# Patient Record
Sex: MALE | Race: BLACK OR AFRICAN AMERICAN
[De-identification: names, ages, dates, MRNs, and addresses within clinical notes are randomized per-mention and may not be internally consistent; named-entity substitution may affect disease eponyms.]

---

## 2017-12-27 ENCOUNTER — HOSPITAL ENCOUNTER (OUTPATIENT)
Dept: HOSPITAL 24 - ER | Age: 37
Setting detail: OBSERVATION
LOS: 3 days | Discharge: HOME | End: 2017-12-30
Attending: INTERNAL MEDICINE | Admitting: INTERNAL MEDICINE
Payer: COMMERCIAL

## 2017-12-27 VITALS — BODY MASS INDEX: 34.2 KG/M2

## 2017-12-27 DIAGNOSIS — M62.82: ICD-10-CM

## 2017-12-27 DIAGNOSIS — R94.4: ICD-10-CM

## 2017-12-27 DIAGNOSIS — D86.0: ICD-10-CM

## 2017-12-27 DIAGNOSIS — R06.02: ICD-10-CM

## 2017-12-27 DIAGNOSIS — R94.31: ICD-10-CM

## 2017-12-27 DIAGNOSIS — R07.89: Primary | ICD-10-CM

## 2017-12-27 LAB
ALBUMIN SERPL BCP-MCNC: 3.7 G/DL (ref 3.4–5)
ALP SERPL-CCNC: 116 UNITS/L (ref 46–116)
ALT SERPL W P-5'-P-CCNC: 54 UNITS/L (ref 12–78)
AST SERPL W P-5'-P-CCNC: 61 UNITS/L (ref 15–37)
BACTERIA URNS QL MICRO: (no result) /HPF
BASOPHILS # BLD AUTO: 0.1 X10^3/UL (ref 0–0.1)
BASOPHILS NFR BLD AUTO: 1.5 % (ref 0.2–1)
BILIRUB UR QL STRIP.AUTO: NEGATIVE
BUN SERPL-MCNC: 17 MG/DL (ref 7–18)
CALCIUM ALBUM COR SERPL-SCNC: (no result) MG/DL (ref 8.5–10.1)
CALCIUM ALBUM COR SERPL-SCNC: 140 MMOL/L (ref 136–145)
CALCIUM SERPL-MCNC: 9.5 MG/DL (ref 8.5–10.1)
CHLORIDE SERPL-SCNC: 105 MMOL/L (ref 98–107)
CK MB SERPL-MCNC: 16 NG/ML (ref 0–4)
CK SERPL-CCNC: 1577 UNITS/L (ref 39–308)
CKMB %: 1 % (ref ?–4)
CLARITY UR: CLEAR
CO2 SERPL-SCNC: 27.4 MMOL/L (ref 21–32)
COLOR UR AUTO: (no result)
CREAT SERPL-MCNC: 1.59 MG/DL (ref 0.7–1.3)
EGFR  BLACK RACES: > 60 (ref 60–?)
EOSINOPHIL # BLD AUTO: 0.2 X10^3/UL (ref 0–0.2)
EOSINOPHIL NFR BLD AUTO: 3.7 % (ref 0.9–2.9)
ERYTHROCYTE [DISTWIDTH] IN BLOOD BY AUTOMATED COUNT: 13.8 % (ref 11.6–16.5)
GLUCOSE UR QL STRIP.AUTO: NEGATIVE
HCT VFR BLD AUTO: 42.8 % (ref 42–54)
HGB BLD-MCNC: 14.7 G/DL (ref 13.5–18)
KETONES UR QL STRIP.AUTO: NEGATIVE
LEUKOCYTE ESTERASE UR QL STRIP.AUTO: (no result)
LYMPHOCYTES # BLD AUTO: 2.3 X10^3/UL (ref 1.3–2.9)
LYMPHOCYTES NFR BLD AUTO: 41.1 % (ref 21–51)
MAGNESIUM SERPL-MCNC: 1.7 MG/DL (ref 1.7–2.9)
MCH RBC QN AUTO: 30.6 PG (ref 27–34)
MCHC RBC AUTO-ENTMCNC: 34.4 G/DL (ref 33–35)
MCV RBC AUTO: 89.1 FL (ref 80–100)
MONOCYTES # BLD AUTO: 0.8 X10^3/UL (ref 0.3–0.8)
MONOCYTES NFR BLD AUTO: 14.1 % (ref 0–13)
NEUTROPHILS # BLD AUTO: 2.3 X10^3/UL (ref 2.2–4.8)
NEUTROPHILS NFR BLD AUTO: 39.6 % (ref 42–75)
NITRITE UR QL STRIP.AUTO: NEGATIVE
PH UR STRIP.AUTO: 7 [PH] (ref 5–8)
PLATELET # BLD: 258 X10^3/UL (ref 150–450)
PMV BLD AUTO: 6.4 FL (ref 7.4–11)
PROT SERPL-MCNC: 7.6 G/DL (ref 6.4–8.2)
PROT UR QL STRIP.AUTO: NEGATIVE
RBC # BLD AUTO: 4.81 X10^6/UL (ref 4.7–6)
RBC # UR STRIP.AUTO: NEGATIVE /UL
RBC #/AREA URNS HPF: (no result) /HPF
SODIUM SERPL-SCNC: 140 MMOL/L (ref 136–145)
SP GR UR STRIP.AUTO: 1.01 (ref 1–1.03)
SQUAMOUS URNS QL MICRO: (no result) /HPF
TROPONIN I SERPL-MCNC: < 0.02 NG/ML (ref 0–1.5)
UROBILINOGEN UR QL STRIP.AUTO: (no result)
WBC NRBC COR # BLD AUTO: 5.7 X10^3/UL (ref 3.6–10)

## 2017-12-27 PROCEDURE — G0378 HOSPITAL OBSERVATION PER HR: HCPCS

## 2017-12-27 PROCEDURE — 71010: CPT

## 2017-12-27 PROCEDURE — 94760 N-INVAS EAR/PLS OXIMETRY 1: CPT

## 2017-12-27 PROCEDURE — 99217: CPT

## 2017-12-27 PROCEDURE — 82553 CREATINE MB FRACTION: CPT

## 2017-12-27 PROCEDURE — 81001 URINALYSIS AUTO W/SCOPE: CPT

## 2017-12-27 PROCEDURE — 80061 LIPID PANEL: CPT

## 2017-12-27 PROCEDURE — 85610 PROTHROMBIN TIME: CPT

## 2017-12-27 PROCEDURE — 93005 ELECTROCARDIOGRAM TRACING: CPT

## 2017-12-27 PROCEDURE — 85730 THROMBOPLASTIN TIME PARTIAL: CPT

## 2017-12-27 PROCEDURE — 84484 ASSAY OF TROPONIN QUANT: CPT

## 2017-12-27 PROCEDURE — 71020: CPT

## 2017-12-27 PROCEDURE — G0434 DRUG SCREEN MULTI DRUG CLASS: HCPCS

## 2017-12-27 PROCEDURE — 82550 ASSAY OF CK (CPK): CPT

## 2017-12-27 PROCEDURE — 83735 ASSAY OF MAGNESIUM: CPT

## 2017-12-27 PROCEDURE — 80053 COMPREHEN METABOLIC PANEL: CPT

## 2017-12-27 PROCEDURE — 80307 DRUG TEST PRSMV CHEM ANLYZR: CPT

## 2017-12-27 PROCEDURE — 99284 EMERGENCY DEPT VISIT MOD MDM: CPT

## 2017-12-27 PROCEDURE — 93010 ELECTROCARDIOGRAM REPORT: CPT

## 2017-12-27 PROCEDURE — 96365 THER/PROPH/DIAG IV INF INIT: CPT

## 2017-12-27 PROCEDURE — 85025 COMPLETE CBC W/AUTO DIFF WBC: CPT

## 2017-12-27 PROCEDURE — 94640 AIRWAY INHALATION TREATMENT: CPT

## 2017-12-27 PROCEDURE — 36415 COLL VENOUS BLD VENIPUNCTURE: CPT

## 2017-12-27 NOTE — RAD
Chest, one view



Indication: Sharp chest pain



Comparison: 08/01/2016



Findings: The heart size is normal. There are stable chronic interstitial opacities within the mid ramy
ngs bilaterally. No focal consolidation, significant effusion or pneumothorax is identified. No acute
 osseous abnormality is seen.



Impression:  

No acute cardiopulmonary abnormality or significant change since prior.



















Reported By:Electronically Signed by JOANN BRIONES MD at 12/27/2017 9:30:01 PM

## 2017-12-27 NOTE — DR.GENAD
HPI





- PCP


Primary Care Physician: bib





- Complaint/Symptoms


Chief Complaint Doctors Comments: Patient presents to the ED with complaint of 

sharp left/right chest pain for three days.  He denies a history of 

cardiopulmonary disease.  He admits to a pack per week cigarettes smoking.  He 

is diagnosed with sarcoidosis. He is on prednisone daily and has been seen by a 

pulmonologist times one since diagnosis.


Chief Complaint:: pt states" I'm having sharp chest pain on both sides of my 

chest started 3 days ago"





- Source


History Provided: Patient





- Mode of Arrival


Mode of Arrival: Ambulatory





- Timing


Onset of Chief Complaint: 12/25/17





PMH





- PMH


Past Medical History: No


Past Surgical History: No


Surgical History: No History





- Family History


History of Family Medical Conditions: No





- Social History


Does patient currently use any type of tobacco product: Yes


Have you used tobacco products in the last 12 months: No


Type of Tobacco Use: Cigarettes


Does any household member use tobacco: No


Alcohol Use: None


Do you use any recreational Drugs:: No


Lives With: Spouse


Lives Where: Home





- infectious screening


In the last 2 months have you had wt loss of >10#?: NO


Have you had fever, night sweats or hemotysis?: No


Have you traveled outside the country in the last 6 months?: No


Isolation: Standard





ROS





- Review of Systems


Eyes: No Symptoms Reported


ENTM: No Symptoms Reported


Respiratoy: No Symptoms Reported


Cardiovascular: No Symptoms Reported


Gastrointestinal/Abdominal: No Symptoms Reported


Genitourinary: No Symptoms Reported


Neurological: No Symptoms Reported


Musculoskeletal: No Symptoms Reported


Integumentary: No Symptoms Reported


Hematologic/Lymphatic: No Symptoms Reported


Endocrine: No Symptoms Reported


Psychiatric: No Symptoms Reported


All Other Systems: Reviewed and Negative





PE





- Vital Signs


Vitals: 


 





Temperature                      97.9 F


Pulse Rate [Right Radial]        78


Pulse Rate                       72


Respiratory Rate                 18


Blood Pressure [Right Arm]       128/84


Blood Pressure                   135/86


O2 Sat by Pulse Oximetry         100











- General


Limitations: No Limitations


General Appearance: Alert, In No Apparent Distress





- Head


Head Exam: Normal Inspection, Atraumatic





- Eyes


Eye exam: Normal Appearance, PERRL, EOMI





- ENT


ENT Exam: Normal Exam


External Ear Exam: Normal External Inspection


TM/Canal Exam: Bilateral Normal


Nose Exam: Normal Nose Exam


Mouth Exam: Normal Inspection


Throat Exam: Normal Inspection





- Neck


Neck Exam: Normal Inspection, Full ROM





- Chest


Chest Inspection: Normal Inspection





- Respiratory


Respiratory Exam: Prolonged Expiratory Phase


Respiratory Exam: Bilateral Wheezing





- Cardiovascular


Cardiovascular Exam: Regular Rate, Normal Rhythm





- Abdominal Exam


Abdominal Exam: Normal Inspection


Abdominal Tenderness: negative: RUQ, RLQ, LUQ, LLQ, Epigastrium, Suprapubic, 

Diffuse, Mild, Moderate, Severe, Other





- Extremities


Extremities Exam: Normal Inspection





- Back


Back Exam: Normal Inspection





- Neurologic


Neurological Exam: Alert, Oriented X3, CN II-XII Intact





- Psychiatric


Psychiatric Exam: Normal Affect





- Skin


Skin Exam: Warm, Dry, Intact





Course





- Consultation


Called: 11:30 (Dr Bunn agreed to admit for further therapy and evaluation)





- Education/Counseling


Education/Counseling: Patient


Educated On: Treatment, Diagnosis, Prognosis





ROR





- Labs Reviewed


Laboratory Results Reviewed?: Yes (CK 1577;CK-MB 16)


Result Diagrams: 


 12/27/17 21:16





 12/27/17 21:16


Laboratory: 


 











WBC  5.7 X10^3/uL (3.6-10.0)   12/27/17  21:16    


 


RBC  4.81 X10^6/uL (4.7-6.0)   12/27/17  21:16    


 


Hgb  14.7 g/dL (13.5-18.0)   12/27/17  21:16    


 


Hct  42.8 % (42.0-54.0)   12/27/17  21:16    


 


MCV  89.1 fL (80.0-100.0)   12/27/17  21:16    


 


MCH  30.6 pg (27.0-34.0)   12/27/17  21:16    


 


MCHC  34.4 g/dL (33.0-35.0)   12/27/17  21:16    


 


RDW  13.8 % (11.6-16.5)   12/27/17  21:16    


 


Plt Count  258 X10^3/uL (150.0-450.0)   12/27/17  21:16    


 


MPV  6.4 fL (7.4-11.0)  L  12/27/17  21:16    


 


Neut %  39.6 % (42.0-75.0)  L  12/27/17  21:16    


 


Lymph %  41.1 % (21.0-51.0)   12/27/17  21:16    


 


Mono %  14.1 % (0.0-13.0)  H  12/27/17  21:16    


 


Eos %  3.7 % (0.9-2.9)  H  12/27/17  21:16    


 


Baso %  1.5 % (0.2-1.0)  H  12/27/17  21:16    


 


Neut #  2.3 x10^3/uL (2.2-4.8)   12/27/17  21:16    


 


Lymph #  2.3 X10^3/uL (1.3-2.9)   12/27/17  21:16    


 


Mono #  0.8 x10^3/uL (0.3-0.8)   12/27/17  21:16    


 


Eos #  0.2 x10^3/uL (0.0-0.2)   12/27/17  21:16    


 


Baso #  0.1 X10^3/uL (0.0-0.1)   12/27/17  21:16    


 


Absolute Nucleated RBC  0.1 /100WBC  12/27/17  21:16    


 


INR Target Range  -   12/27/17  21:16    


 


INR  1.01  (0.8-1.3)   12/27/17  21:16    


 


PTT  35.0 SECONDS (22.9-36.5)   12/27/17  21:16    


 


PTT Comment  -   12/27/17  21:16    


 


Sodium  140 mmol/L (136-145)   12/27/17  21:16    


 


Corrected Sodium  140 mmol/L (136-145)   12/27/17  21:16    


 


Potassium  4.2 mmol/L (3.5-5.1)   12/27/17  21:16    


 


Chloride  105 mmol/L ()   12/27/17  21:16    


 


Carbon Dioxide  27.4 mmol/L (21-32)   12/27/17  21:16    


 


BUN  17 mg/dL (7-18)   12/27/17  21:16    


 


Creatinine  1.59 mg/dL (0.70-1.30)  H  12/27/17  21:16    


 


Est GFR (MDRD) Af Amer  > 60  (>60)   12/27/17  21:16    


 


Est GFR (MDRD) Non-Af  52  (>60)  L  12/27/17  21:16    


 


Glucose  114 mg/dL (65-99)  H  12/27/17  21:16    


 


Calcium  9.5 mg/dL (8.5-10.1)   12/27/17  21:16    


 


Corrected Calcium  TNP   12/27/17  21:16    


 


Magnesium  1.7 mg/dL (1.7-2.9)   12/27/17  21:16    


 


Total Bilirubin  0.40 mg/dL (0.2-1.0)   12/27/17  21:16    


 


AST  61 Units/L (15-37)  H  12/27/17  21:16    


 


ALT  54 Units/L (12-78)   12/27/17  21:16    


 


Alkaline Phosphatase  116 Units/L ()   12/27/17  21:16    


 


Creatine Kinase  1577 Units/L ()  H  12/27/17  21:16    


 


CK-MB (CK-2)  16.0 ng/mL (0-4.0)  H*  12/27/17  21:16    


 


CK/CKMB % Calc  1.0 % (<4)   12/27/17  21:16    


 


Troponin I  < 0.02 ng/mL (0-1.5)   12/27/17  21:16    


 


Total Protein  7.6 g/dL (6.4-8.2)   12/27/17  21:16    


 


Albumin  3.7 g/dL (3.4-5.0)   12/27/17  21:16    


 


Globulin  3.9 g/dL (2.5-4.5)   12/27/17  21:16    


 


Albumin/Globulin Ratio  0.9 Ratio (1.1-2.1)  L  12/27/17  21:16    


 


Specimen Type  Clean catch urine   12/27/17  22:59    


 


Urine Color  Dark yellow  (YELLOW)   12/27/17  22:59    


 


Urine Appearance  Clear  (CLEAR)   12/27/17  22:59    


 


Urine pH  7.0  (5.0 - 8.0)   12/27/17  22:59    


 


Ur Specific Gravity  1.015  (1.000-1.030)   12/27/17  22:59    


 


Urine Protein  Negative  (NEGATIVE)   12/27/17  22:59    


 


Urine Glucose (UA)  Negative  (NEGATIVE)   12/27/17  22:59    


 


Urine Ketones  Negative  (NEGATIVE)   12/27/17  22:59    


 


Urine Occult Blood  Negative  (NEGATIVE)   12/27/17  22:59    


 


Urine Nitrite  Negative  (NEGATIVE)   12/27/17  22:59    


 


Urine Bilirubin  Negative  (NEGATIVE)   12/27/17  22:59    


 


Urine Urobilinogen  2+  (NORMAL)   12/27/17  22:59    


 


Ur Leukocyte Esterase  1+  (NEGATIVE)   12/27/17  22:59    


 


Urine RBC  None seen /HPF (NEGATIVE)   12/27/17  22:59    


 


Urine WBC  0-1 /HPF (NEGATIVE)   12/27/17  22:59    


 


Ur Squamous Epith Cells  Rare /HPF (NEGATIVE)   12/27/17  22:59    


 


Urine Bacteria  Trace /HPF (NEGATIVE)   12/27/17  22:59    


 


Ur Culture Indicated?  No/not indicated   12/27/17  22:59    


 


Urine Opiates Screen  Negative  (NEG=<300)   12/27/17  22:59    


 


Urine Methadone Screen  Negative  (NEG=<300)   12/27/17  22:59    


 


Ur Barbiturates Screen  Negative  (NEG=<200)   12/27/17  22:59    


 


Ur Phencyclidine Scrn  Negative  (NEG=<25)   12/27/17  22:59    


 


Ur Amphetamines Screen  Negative  (NEG=<1000)   12/27/17  22:59    


 


U Benzodiazepines Scrn  Negative  (NEG=<200)   12/27/17  22:59    


 


Urine Cocaine Screen  Negative  (NEG=<300)   12/27/17  22:59    


 


U Marijuana (THC) Screen  Negative  (NEG=<50)   12/27/17  22:59    














- XRAY


XRAY Interpreted by: Radiologist





- Diagnosis


Discharge Problem: 


 Sarcoidosis of lung





Rhabdomyolysis


Qualifiers:


 Rhabdomyolysis type: non-traumatic Qualified Code(s): M62.82 - Rhabdomyolysis








- Discharge Plan


Condition: Stable





- Follow ups/Referrals


Follow ups/Referrals: 


AMARIS MORTON [Primary Care Provider] - 3 days





- Instructions

## 2017-12-28 LAB
ALBUMIN SERPL BCP-MCNC: 3.3 G/DL (ref 3.4–5)
ALBUMIN SERPL BCP-MCNC: 3.4 G/DL (ref 3.4–5)
ALP SERPL-CCNC: 102 UNITS/L (ref 46–116)
ALP SERPL-CCNC: 105 UNITS/L (ref 46–116)
ALT SERPL W P-5'-P-CCNC: 46 UNITS/L (ref 12–78)
ALT SERPL W P-5'-P-CCNC: 46 UNITS/L (ref 12–78)
AST SERPL W P-5'-P-CCNC: 47 UNITS/L (ref 15–37)
AST SERPL W P-5'-P-CCNC: 48 UNITS/L (ref 15–37)
BUN SERPL-MCNC: 19 MG/DL (ref 7–18)
BUN SERPL-MCNC: 19 MG/DL (ref 7–18)
CALCIUM ALBUM COR SERPL-SCNC: (no result) MG/DL (ref 8.5–10.1)
CALCIUM ALBUM COR SERPL-SCNC: (no result) MMOL/L (ref 136–145)
CALCIUM ALBUM COR SERPL-SCNC: 142 MMOL/L (ref 136–145)
CALCIUM ALBUM COR SERPL-SCNC: 9.7 MG/DL (ref 8.5–10.1)
CALCIUM SERPL-MCNC: 9.1 MG/DL (ref 8.5–10.1)
CALCIUM SERPL-MCNC: 9.1 MG/DL (ref 8.5–10.1)
CHLORIDE SERPL-SCNC: 108 MMOL/L (ref 98–107)
CHLORIDE SERPL-SCNC: 109 MMOL/L (ref 98–107)
CHOLEST SERPL-MCNC: 193 MG/DL (ref 0–200)
CHOLEST/HDLC SERPL: 7.1 {RATIO} (ref 0–5)
CK MB SERPL-MCNC: 11.1 NG/ML (ref 0–4)
CK MB SERPL-MCNC: 8.6 NG/ML (ref 0–4)
CK MB SERPL-MCNC: 9.9 NG/ML (ref 0–4)
CK SERPL-CCNC: 1166 UNITS/L (ref 39–308)
CK SERPL-CCNC: 1171 UNITS/L (ref 39–308)
CK SERPL-CCNC: 781 UNITS/L (ref 39–308)
CK SERPL-CCNC: 935 UNITS/L (ref 39–308)
CKMB %: 0.9 % (ref ?–4)
CKMB %: 0.9 % (ref ?–4)
CKMB %: 1 % (ref ?–4)
CO2 SERPL-SCNC: 24 MMOL/L (ref 21–32)
CO2 SERPL-SCNC: 24.9 MMOL/L (ref 21–32)
CREAT SERPL-MCNC: 1.34 MG/DL (ref 0.7–1.3)
CREAT SERPL-MCNC: 1.34 MG/DL (ref 0.7–1.3)
EGFR  BLACK RACES: > 60 (ref 60–?)
EGFR  BLACK RACES: > 60 (ref 60–?)
HDLC SERPL-MCNC: 27 MG/DL (ref 40–60)
PROT SERPL-MCNC: 7 G/DL (ref 6.4–8.2)
PROT SERPL-MCNC: 7 G/DL (ref 6.4–8.2)
SODIUM SERPL-SCNC: 141 MMOL/L (ref 136–145)
SODIUM SERPL-SCNC: 142 MMOL/L (ref 136–145)
TRIGL SERPL-MCNC: 309 MG/DL (ref 0–150)
TROPONIN I SERPL-MCNC: < 0.02 NG/ML (ref 0–1.5)

## 2017-12-28 RX ADMIN — SODIUM CHLORIDE SCH MLS/HR: 9 INJECTION, SOLUTION INTRAVENOUS at 09:07

## 2017-12-28 RX ADMIN — DOCUSATE SODIUM SCH: 100 CAPSULE, LIQUID FILLED ORAL at 21:18

## 2017-12-28 RX ADMIN — DOCUSATE SODIUM SCH MG: 100 CAPSULE, LIQUID FILLED ORAL at 09:07

## 2017-12-28 RX ADMIN — SODIUM CHLORIDE SCH MLS/HR: 9 INJECTION, SOLUTION INTRAVENOUS at 17:00

## 2017-12-28 RX ADMIN — SODIUM CHLORIDE SCH MLS/HR: 9 INJECTION, SOLUTION INTRAVENOUS at 05:22

## 2017-12-28 RX ADMIN — IPRATROPIUM BROMIDE AND ALBUTEROL SULFATE SCH EA: .5; 3 SOLUTION RESPIRATORY (INHALATION) at 20:35

## 2017-12-28 RX ADMIN — SODIUM CHLORIDE SCH: 9 INJECTION, SOLUTION INTRAMUSCULAR; INTRAVENOUS; SUBCUTANEOUS at 13:05

## 2017-12-28 RX ADMIN — SODIUM CHLORIDE SCH MLS/HR: 9 INJECTION, SOLUTION INTRAVENOUS at 21:19

## 2017-12-28 RX ADMIN — SODIUM CHLORIDE SCH MLS/HR: 9 INJECTION, SOLUTION INTRAVENOUS at 01:17

## 2017-12-28 RX ADMIN — SODIUM CHLORIDE SCH MLS/HR: 9 INJECTION, SOLUTION INTRAVENOUS at 12:57

## 2017-12-28 RX ADMIN — IPRATROPIUM BROMIDE AND ALBUTEROL SULFATE SCH EA: .5; 3 SOLUTION RESPIRATORY (INHALATION) at 09:50

## 2017-12-28 RX ADMIN — IPRATROPIUM BROMIDE AND ALBUTEROL SULFATE SCH EA: .5; 3 SOLUTION RESPIRATORY (INHALATION) at 04:58

## 2017-12-28 RX ADMIN — SODIUM CHLORIDE SCH: 9 INJECTION, SOLUTION INTRAMUSCULAR; INTRAVENOUS; SUBCUTANEOUS at 06:25

## 2017-12-28 RX ADMIN — IPRATROPIUM BROMIDE AND ALBUTEROL SULFATE SCH EA: .5; 3 SOLUTION RESPIRATORY (INHALATION) at 12:00

## 2017-12-28 RX ADMIN — MAGNESIUM HYDROXIDE SCH ML: 400 SUSPENSION ORAL at 09:07

## 2017-12-28 RX ADMIN — IPRATROPIUM BROMIDE AND ALBUTEROL SULFATE SCH EA: .5; 3 SOLUTION RESPIRATORY (INHALATION) at 16:00

## 2017-12-28 RX ADMIN — MAGNESIUM HYDROXIDE SCH: 400 SUSPENSION ORAL at 21:19

## 2017-12-28 NOTE — DR.H&P
H&P





- History & Physical for Day of:


H&P Date: 12/28/17





- Chief Complaint


Chief Complaint: CP





- Allergies


Allergies/Adverse Reactions: 


 Allergies











Allergy/AdvReac Type Severity Reaction Status Date / Time


 


No Known Drug Allergies Allergy   Verified 12/27/17 21:02














- History of Present Illness


History of Present Illness: 37 BM ADMITTED FROM ER AFTER PRESENTING WITH CO 

CHEST PAIN, WORSE TODAY, BUT STARTED 2-3 DAYS AGO. PT CO SOB, WHICH HE STATES 

IS CHRONIC DUE TO SARCOIDOSIS. PT DENIES ANY HX OF CAD, HTN OR DM.  PT DENIES 

ANY GI SYMPTOMS. PT HAD ELEVATED CPK ON ADMISSION. PT ADMITTED FOR SERIAL CE'S 

EKGS.





- Past Medical History


Additional Medical History: SARCOIDOSIS





- Past Surgical History


Surgical History: No History





- Social History


Does patient currently use any type of tobacco product: Yes


Have you used tobacco products in the last 12 months: Yes


Type of Tobacco Use: Cigarettes


How many years tobacco product used: 3


Does any household member use tobacco: No


Alcohol Use: None


Drug Use: None





- Medications


Home Medications: 


 





No Known Home Medications 1 : XX DAILY 12/28/17 [History Confirmed 12/28/17]











- Review of Systems


Constitutional: No Symptoms Reported


Eyes: No Symptoms Reported


ENT: No Symptoms Reported


Respiratory: Shortness of Breath


Cardiovascular: Chest Pain


Gastrointestinal: No Symptoms Reported


Genitourinary: No Symptoms Reported


Musculoskeletal: No Symptoms Reported


Skin: No Symptoms Reported


Neurological: No Symptoms Reported





- Physical Exam


Vital Signs: 


 





Temperature                      97.7 F


Pulse Rate [Right Radial]        68


Pulse Rate                       77


Respiratory Rate                 18


Blood Pressure [Right Arm]       123/71


Blood Pressure                   135/86


O2 Sat by Pulse Oximetry         97








Oriented: Normal


Eyes: Normal


Ear: Normal


Nose: Normal


Throat: Normal


Respiratory: RLL Diminished, LLL Diminished


Cardiovascular: Normal


: Normal


Auscultation: Bowel Sounds: Normal


Palpation: Normal


Tenderness: Normal


Skin: Normal


Musculoskeletal: Normal


Psychiatric: Normal


Mood Description: Calm


Speech Pattern: Clear





- Assessment/Plan


(1) Chest pain


Status: Acute   


Plan: ADMIT, SERIAL CE'S EKGS.  IV HYDRATION.  BP MONITORING   





(2) Rhabdomyolysis


Qualifiers: 


   Rhabdomyolysis type: non-traumatic   Qualified Code(s): M62.82 - 

Rhabdomyolysis   


Status: Acute

## 2017-12-29 LAB
ALBUMIN SERPL BCP-MCNC: 3 G/DL (ref 3.4–5)
ALP SERPL-CCNC: 99 UNITS/L (ref 46–116)
ALT SERPL W P-5'-P-CCNC: 40 UNITS/L (ref 12–78)
AST SERPL W P-5'-P-CCNC: 37 UNITS/L (ref 15–37)
BASOPHILS # BLD AUTO: 0 X10^3/UL (ref 0–0.1)
BASOPHILS NFR BLD AUTO: 0.3 % (ref 0.2–1)
BUN SERPL-MCNC: 13 MG/DL (ref 7–18)
CALCIUM ALBUM COR SERPL-SCNC: (no result) MMOL/L (ref 136–145)
CALCIUM ALBUM COR SERPL-SCNC: 9.1 MG/DL (ref 8.5–10.1)
CALCIUM SERPL-MCNC: 8.3 MG/DL (ref 8.5–10.1)
CHLORIDE SERPL-SCNC: 110 MMOL/L (ref 98–107)
CK MB SERPL-MCNC: 6.6 NG/ML (ref 0–4)
CO2 SERPL-SCNC: 22.4 MMOL/L (ref 21–32)
CREAT SERPL-MCNC: 1.18 MG/DL (ref 0.7–1.3)
EGFR  BLACK RACES: > 60 (ref 60–?)
EOSINOPHIL # BLD AUTO: 0.2 X10^3/UL (ref 0–0.2)
EOSINOPHIL NFR BLD AUTO: 3.4 % (ref 0.9–2.9)
ERYTHROCYTE [DISTWIDTH] IN BLOOD BY AUTOMATED COUNT: 13.7 % (ref 11.6–16.5)
HCT VFR BLD AUTO: 40.1 % (ref 42–54)
HGB BLD-MCNC: 13.6 G/DL (ref 13.5–18)
LYMPHOCYTES # BLD AUTO: 1.8 X10^3/UL (ref 1.3–2.9)
LYMPHOCYTES NFR BLD AUTO: 35.7 % (ref 21–51)
MCH RBC QN AUTO: 30.7 PG (ref 27–34)
MCHC RBC AUTO-ENTMCNC: 33.9 G/DL (ref 33–35)
MCV RBC AUTO: 90.5 FL (ref 80–100)
MONOCYTES # BLD AUTO: 0.8 X10^3/UL (ref 0.3–0.8)
MONOCYTES NFR BLD AUTO: 15.2 % (ref 0–13)
NEUTROPHILS # BLD AUTO: 2.3 X10^3/UL (ref 2.2–4.8)
NEUTROPHILS NFR BLD AUTO: 45.4 % (ref 42–75)
PLATELET # BLD: 163 X10^3/UL (ref 150–450)
PMV BLD AUTO: 7.8 FL (ref 7.4–11)
PROT SERPL-MCNC: 6.5 G/DL (ref 6.4–8.2)
RBC # BLD AUTO: 4.43 X10^6/UL (ref 4.7–6)
SODIUM SERPL-SCNC: 142 MMOL/L (ref 136–145)
WBC NRBC COR # BLD AUTO: 5.1 X10^3/UL (ref 3.6–10)

## 2017-12-29 RX ADMIN — SODIUM CHLORIDE SCH: 9 INJECTION, SOLUTION INTRAMUSCULAR; INTRAVENOUS; SUBCUTANEOUS at 14:51

## 2017-12-29 RX ADMIN — DOCUSATE SODIUM SCH MG: 100 CAPSULE, LIQUID FILLED ORAL at 08:54

## 2017-12-29 RX ADMIN — SODIUM CHLORIDE SCH MLS/HR: 9 INJECTION, SOLUTION INTRAVENOUS at 16:56

## 2017-12-29 RX ADMIN — SODIUM CHLORIDE SCH: 9 INJECTION, SOLUTION INTRAMUSCULAR; INTRAVENOUS; SUBCUTANEOUS at 21:11

## 2017-12-29 RX ADMIN — SODIUM CHLORIDE SCH MLS/HR: 9 INJECTION, SOLUTION INTRAVENOUS at 01:10

## 2017-12-29 RX ADMIN — IPRATROPIUM BROMIDE AND ALBUTEROL SULFATE SCH EA: .5; 3 SOLUTION RESPIRATORY (INHALATION) at 09:49

## 2017-12-29 RX ADMIN — IPRATROPIUM BROMIDE AND ALBUTEROL SULFATE SCH EA: .5; 3 SOLUTION RESPIRATORY (INHALATION) at 20:28

## 2017-12-29 RX ADMIN — IPRATROPIUM BROMIDE AND ALBUTEROL SULFATE SCH EA: .5; 3 SOLUTION RESPIRATORY (INHALATION) at 16:47

## 2017-12-29 RX ADMIN — SODIUM CHLORIDE SCH MLS/HR: 9 INJECTION, SOLUTION INTRAVENOUS at 04:59

## 2017-12-29 RX ADMIN — DOCUSATE SODIUM SCH MG: 100 CAPSULE, LIQUID FILLED ORAL at 21:11

## 2017-12-29 RX ADMIN — SODIUM CHLORIDE SCH MLS/HR: 9 INJECTION, SOLUTION INTRAVENOUS at 13:02

## 2017-12-29 RX ADMIN — IPRATROPIUM BROMIDE AND ALBUTEROL SULFATE SCH EA: .5; 3 SOLUTION RESPIRATORY (INHALATION) at 04:39

## 2017-12-29 RX ADMIN — SODIUM CHLORIDE SCH MLS/HR: 9 INJECTION, SOLUTION INTRAVENOUS at 08:54

## 2017-12-29 RX ADMIN — SODIUM CHLORIDE SCH: 9 INJECTION, SOLUTION INTRAMUSCULAR; INTRAVENOUS; SUBCUTANEOUS at 02:18

## 2017-12-29 RX ADMIN — SODIUM CHLORIDE SCH MLS/HR: 9 INJECTION, SOLUTION INTRAVENOUS at 21:00

## 2017-12-29 RX ADMIN — MAGNESIUM HYDROXIDE SCH ML: 400 SUSPENSION ORAL at 21:11

## 2017-12-29 RX ADMIN — IPRATROPIUM BROMIDE AND ALBUTEROL SULFATE SCH EA: .5; 3 SOLUTION RESPIRATORY (INHALATION) at 01:23

## 2017-12-29 RX ADMIN — MAGNESIUM HYDROXIDE SCH ML: 400 SUSPENSION ORAL at 08:53

## 2017-12-29 RX ADMIN — IPRATROPIUM BROMIDE AND ALBUTEROL SULFATE SCH EA: .5; 3 SOLUTION RESPIRATORY (INHALATION) at 13:33

## 2017-12-29 RX ADMIN — SODIUM CHLORIDE SCH: 9 INJECTION, SOLUTION INTRAMUSCULAR; INTRAVENOUS; SUBCUTANEOUS at 05:00

## 2017-12-29 NOTE — PCM.PROG
Progress Note





- Progress Note for Day of


Date: 12/29/17





- Subjective


Subjective: the patient is a 37-year-old black male who was admitted one day 

ago with chest pain.  Patient had elevated CPK suggestive of rhabdomyolysis.  

Patient had serial cardiac enzymes and EKGs results were reviewed with patient.

  Patient has had improvement of symptoms.  Discussed the need to continue IV 

and by mouth hydration for complete resolution.  Patient denies any increased 

shortness of breath or GI symptoms this morning.





- Past Medical Family Social History


Past Med/Fam/Surg Hx: No changes since H&P


Allergies: 


Allergies





No Known Drug Allergies Allergy (Verified 12/27/17 21:02)


 











- Review of Systems


ROS: No change since H&P





- Vital Signs and I&O's


Vital Signs: 


 





Temperature                      98.7 F


Pulse Rate [Right Radial]        90


Pulse Rate                       88


Respiratory Rate                 18


Blood Pressure [Left Arm]        173/68


Blood Pressure [Right Arm]       136/75


Blood Pressure                   135/86


O2 Sat by Pulse Oximetry         97








Intake and Output: 


 Intake & Output











 12/27/17 12/28/17 12/29/17 12/30/17





 11:59 11:59 11:59 11:59


 


Intake Total  1250 4380 


 


Output Total  0  


 


Balance  1250 4380 














- Physical Exam


Oriented: Normal


Eyes: Normal


Ear: Normal


Nose: Normal


Throat: Normal


Respiratory: Wheezes


Cardiovascular: Normal


: Normal


Auscultation: Bowel Sounds: Normal


Tenderness: Normal


Skin: Normal


Musculoskeletal: Normal


Psychiatric: Normal


Mood Description: Calm


Speech Pattern: Clear





- Laboratory and Diagnostics


Result Diagrams: 


 12/29/17 03:50





 12/29/17 03:50


Labs: 


 Laboratory











WBC  5.1 X10^3/uL (3.6-10.0)   12/29/17  03:50    


 


RBC  4.43 X10^6/uL (4.7-6.0)  L  12/29/17  03:50    


 


Hgb  13.6 g/dL (13.5-18.0)   12/29/17  03:50    


 


Hct  40.1 % (42.0-54.0)  L  12/29/17  03:50    


 


MCV  90.5 fL (80.0-100.0)   12/29/17  03:50    


 


MCH  30.7 pg (27.0-34.0)   12/29/17  03:50    


 


MCHC  33.9 g/dL (33.0-35.0)   12/29/17  03:50    


 


RDW  13.7 % (11.6-16.5)   12/29/17  03:50    


 


Plt Count  163 X10^3/uL (150.0-450.0)   12/29/17  03:50    


 


MPV  7.8 fL (7.4-11.0)   12/29/17  03:50    


 


Neut %  45.4 % (42.0-75.0)   12/29/17  03:50    


 


Lymph %  35.7 % (21.0-51.0)   12/29/17  03:50    


 


Mono %  15.2 % (0.0-13.0)  H  12/29/17  03:50    


 


Eos %  3.4 % (0.9-2.9)  H  12/29/17  03:50    


 


Baso %  0.3 % (0.2-1.0)   12/29/17  03:50    


 


Neut #  2.3 x10^3/uL (2.2-4.8)   12/29/17  03:50    


 


Lymph #  1.8 X10^3/uL (1.3-2.9)   12/29/17  03:50    


 


Mono #  0.8 x10^3/uL (0.3-0.8)   12/29/17  03:50    


 


Eos #  0.2 x10^3/uL (0.0-0.2)   12/29/17  03:50    


 


Baso #  0.0 X10^3/uL (0.0-0.1)   12/29/17  03:50    


 


Absolute Nucleated RBC  0.1 /100WBC  12/29/17  03:50    


 


INR Target Range  -   12/27/17  21:16    


 


INR  1.01  (0.8-1.3)   12/27/17  21:16    


 


PTT  35.0 SECONDS (22.9-36.5)   12/27/17  21:16    


 


PTT Comment  -   12/27/17  21:16    


 


Sodium  142 mmol/L (136-145)   12/29/17  03:50    


 


Corrected Sodium  TNP   12/29/17  03:50    


 


Potassium  4.1 mmol/L (3.5-5.1)   12/29/17  03:50    


 


Chloride  110 mmol/L ()  H  12/29/17  03:50    


 


Carbon Dioxide  22.4 mmol/L (21-32)   12/29/17  03:50    


 


BUN  13 mg/dL (7-18)   12/29/17  03:50    


 


Creatinine  1.18 mg/dL (0.70-1.30)   12/29/17  03:50    


 


Est GFR (MDRD) Af Amer  > 60  (>60)   12/29/17  03:50    


 


Est GFR (MDRD) Non-Af  > 60  (>60)   12/29/17  03:50    


 


Glucose  102 mg/dL (65-99)  H  12/29/17  03:50    


 


Calcium  8.3 mg/dL (8.5-10.1)  L  12/29/17  03:50    


 


Corrected Calcium  9.1 mg/dL (8.5-10.1)   12/29/17  03:50    


 


Magnesium  1.7 mg/dL (1.7-2.9)   12/27/17  21:16    


 


Total Bilirubin  0.30 mg/dL (0.2-1.0)   12/29/17  03:50    


 


AST  37 Units/L (15-37)   12/29/17  03:50    


 


ALT  40 Units/L (12-78)   12/29/17  03:50    


 


Alkaline Phosphatase  99 Units/L ()   12/29/17  03:50    


 


Creatine Kinase  781 Units/L ()  H  12/28/17  22:55    


 


CK-MB (CK-2)  6.6 ng/mL (0-4.0)  H*  12/28/17  22:55    


 


CK/CKMB % Calc  0.9 % (<4)   12/28/17  22:55    


 


Troponin I  < 0.02 ng/mL (0-1.5)   12/28/17  22:55    


 


Total Protein  6.5 g/dL (6.4-8.2)   12/29/17  03:50    


 


Albumin  3.0 g/dL (3.4-5.0)  L  12/29/17  03:50    


 


Globulin  3.5 g/dL (2.5-4.5)   12/29/17  03:50    


 


Albumin/Globulin Ratio  0.9 Ratio (1.1-2.1)  L  12/29/17  03:50    


 


Triglycerides  309 mg/dL (0-150)  H  12/28/17  04:10    


 


Cholesterol  193 mg/dL (0-200)   12/28/17  04:10    


 


LDL Cholesterol, Calc  104 mg/dL (0-100)  H  12/28/17  04:10    


 


HDL Cholesterol  27 mg/dL (40-60)  L  12/28/17  04:10    


 


Cholesterol/HDL Ratio  7.1  (0.0-5.0)  H  12/28/17  04:10    


 


Specimen Type  Clean catch urine   12/27/17  22:59    


 


Urine Color  Dark yellow  (YELLOW)   12/27/17  22:59    


 


Urine Appearance  Clear  (CLEAR)   12/27/17  22:59    


 


Urine pH  7.0  (5.0 - 8.0)   12/27/17  22:59    


 


Ur Specific Gravity  1.015  (1.000-1.030)   12/27/17  22:59    


 


Urine Protein  Negative  (NEGATIVE)   12/27/17  22:59    


 


Urine Glucose (UA)  Negative  (NEGATIVE)   12/27/17  22:59    


 


Urine Ketones  Negative  (NEGATIVE)   12/27/17  22:59    


 


Urine Occult Blood  Negative  (NEGATIVE)   12/27/17  22:59    


 


Urine Nitrite  Negative  (NEGATIVE)   12/27/17  22:59    


 


Urine Bilirubin  Negative  (NEGATIVE)   12/27/17  22:59    


 


Urine Urobilinogen  2+  (NORMAL)   12/27/17  22:59    


 


Ur Leukocyte Esterase  1+  (NEGATIVE)   12/27/17  22:59    


 


Urine RBC  None seen /HPF (NEGATIVE)   12/27/17  22:59    


 


Urine WBC  0-1 /HPF (NEGATIVE)   12/27/17  22:59    


 


Ur Squamous Epith Cells  Rare /HPF (NEGATIVE)   12/27/17  22:59    


 


Urine Bacteria  Trace /HPF (NEGATIVE)   12/27/17  22:59    


 


Ur Culture Indicated?  No/not indicated   12/27/17  22:59    


 


Urine Opiates Screen  Negative  (NEG=<300)   12/27/17  22:59    


 


Urine Methadone Screen  Negative  (NEG=<300)   12/27/17  22:59    


 


Ur Barbiturates Screen  Negative  (NEG=<200)   12/27/17  22:59    


 


Ur Phencyclidine Scrn  Negative  (NEG=<25)   12/27/17  22:59    


 


Ur Amphetamines Screen  Negative  (NEG=<1000)   12/27/17  22:59    


 


U Benzodiazepines Scrn  Negative  (NEG=<200)   12/27/17  22:59    


 


Urine Cocaine Screen  Negative  (NEG=<300)   12/27/17  22:59    


 


U Marijuana (THC) Screen  Negative  (NEG=<50)   12/27/17  22:59    














- Plan


(1) Chest pain


Status: Acute   


Plan: SERIAL CE'S & EKGS REVIEWED WITH PT, IMPROVING OF CHEST PAIN SYMPTOMS 

WITH HYDRATION, CONTINUE.  IV HYDRATION, REPEAT AM LABS.  BP MONITORING   





(2) Rhabdomyolysis


Status: Acute   


Qualifiers: 


   Rhabdomyolysis type: non-traumatic   Qualified Code(s): M62.82 - 

Rhabdomyolysis

## 2017-12-29 NOTE — RAD
Examination: Chest, PA and lateral views



History: Chest pain



Comparison reference 12/27/2017



Findings: Continued normal heart size. No evidence for developing consolidation, pneumothorax or pleu
ral fluid. Stable appearance of bilateral interstitial thickening and distortion. The hilar structure
s are prominent.



Impression: No interval change. Findings described most compatible with chronic peribronchial thicken
ing. 

Reported By:Electronically Signed by ERA FERNANDES MD at 12/29/2017 7:19:05 AM

## 2017-12-30 VITALS — SYSTOLIC BLOOD PRESSURE: 157 MMHG | DIASTOLIC BLOOD PRESSURE: 96 MMHG

## 2017-12-30 LAB
ALBUMIN SERPL BCP-MCNC: 3.2 G/DL (ref 3.4–5)
ALP SERPL-CCNC: 108 UNITS/L (ref 46–116)
ALT SERPL W P-5'-P-CCNC: 43 UNITS/L (ref 12–78)
AST SERPL W P-5'-P-CCNC: 39 UNITS/L (ref 15–37)
BASOPHILS # BLD AUTO: 0 X10^3/UL (ref 0–0.1)
BASOPHILS NFR BLD AUTO: 0.4 % (ref 0.2–1)
BUN SERPL-MCNC: 11 MG/DL (ref 7–18)
CALCIUM ALBUM COR SERPL-SCNC: (no result) MMOL/L (ref 136–145)
CALCIUM ALBUM COR SERPL-SCNC: 9.4 MG/DL (ref 8.5–10.1)
CALCIUM SERPL-MCNC: 8.8 MG/DL (ref 8.5–10.1)
CHLORIDE SERPL-SCNC: 109 MMOL/L (ref 98–107)
CK MB SERPL-MCNC: 3.8 NG/ML (ref 0–4)
CK SERPL-CCNC: 476 UNITS/L (ref 39–308)
CO2 SERPL-SCNC: 24.4 MMOL/L (ref 21–32)
CREAT SERPL-MCNC: 1.18 MG/DL (ref 0.7–1.3)
EGFR  BLACK RACES: > 60 (ref 60–?)
EOSINOPHIL # BLD AUTO: 0.2 X10^3/UL (ref 0–0.2)
EOSINOPHIL NFR BLD AUTO: 4 % (ref 0.9–2.9)
ERYTHROCYTE [DISTWIDTH] IN BLOOD BY AUTOMATED COUNT: 13.6 % (ref 11.6–16.5)
HCT VFR BLD AUTO: 38.5 % (ref 42–54)
HGB BLD-MCNC: 13.3 G/DL (ref 13.5–18)
LYMPHOCYTES # BLD AUTO: 1.8 X10^3/UL (ref 1.3–2.9)
LYMPHOCYTES NFR BLD AUTO: 34.1 % (ref 21–51)
MCH RBC QN AUTO: 31.2 PG (ref 27–34)
MCHC RBC AUTO-ENTMCNC: 34.5 G/DL (ref 33–35)
MCV RBC AUTO: 90.4 FL (ref 80–100)
MONOCYTES # BLD AUTO: 0.8 X10^3/UL (ref 0.3–0.8)
MONOCYTES NFR BLD AUTO: 15.3 % (ref 0–13)
NEUTROPHILS # BLD AUTO: 2.4 X10^3/UL (ref 2.2–4.8)
NEUTROPHILS NFR BLD AUTO: 46.2 % (ref 42–75)
PLATELET # BLD: 204 X10^3/UL (ref 150–450)
PMV BLD AUTO: 6.7 FL (ref 7.4–11)
PROT SERPL-MCNC: 6.7 G/DL (ref 6.4–8.2)
RBC # BLD AUTO: 4.26 X10^6/UL (ref 4.7–6)
SODIUM SERPL-SCNC: 143 MMOL/L (ref 136–145)
WBC NRBC COR # BLD AUTO: 5.3 X10^3/UL (ref 3.6–10)

## 2017-12-30 RX ADMIN — IPRATROPIUM BROMIDE AND ALBUTEROL SULFATE SCH EA: .5; 3 SOLUTION RESPIRATORY (INHALATION) at 01:15

## 2017-12-30 RX ADMIN — SODIUM CHLORIDE SCH MLS/HR: 9 INJECTION, SOLUTION INTRAVENOUS at 01:08

## 2017-12-30 RX ADMIN — IPRATROPIUM BROMIDE AND ALBUTEROL SULFATE SCH EA: .5; 3 SOLUTION RESPIRATORY (INHALATION) at 04:48

## 2017-12-30 RX ADMIN — SODIUM CHLORIDE SCH MLS/HR: 9 INJECTION, SOLUTION INTRAVENOUS at 08:23

## 2017-12-30 RX ADMIN — SODIUM CHLORIDE SCH MLS/HR: 9 INJECTION, SOLUTION INTRAVENOUS at 05:20

## 2017-12-30 RX ADMIN — DOCUSATE SODIUM SCH: 100 CAPSULE, LIQUID FILLED ORAL at 08:22

## 2017-12-30 RX ADMIN — SODIUM CHLORIDE SCH: 9 INJECTION, SOLUTION INTRAMUSCULAR; INTRAVENOUS; SUBCUTANEOUS at 05:29

## 2017-12-30 RX ADMIN — IPRATROPIUM BROMIDE AND ALBUTEROL SULFATE SCH EA: .5; 3 SOLUTION RESPIRATORY (INHALATION) at 08:50

## 2017-12-30 RX ADMIN — MAGNESIUM HYDROXIDE SCH: 400 SUSPENSION ORAL at 08:23

## 2017-12-30 NOTE — RAD
Chest, PA and lateral



Indication: Sarcoidosis



Comparison: 12/29/2017



Findings: Cardiac silhouette is unchanged. There are hazy left perihilar and lower lobe opacities. Mi
ld chronic interstitial changes of the lungs are similar to prior. There is stable linear scarring ve
rsus atelectasis of both mid lungs.



Impression: Questionable developing left perihilar and lower lobe infiltrates. Attention on follow-up
 recommended.



Reported By:Electronically Signed by HINA MARCELO MD at 12/30/2017 5:39:07 AM

## 2018-01-13 ENCOUNTER — HOSPITAL ENCOUNTER (EMERGENCY)
Dept: HOSPITAL 24 - ER | Age: 38
Discharge: HOME | End: 2018-01-13
Payer: COMMERCIAL

## 2018-01-13 VITALS — SYSTOLIC BLOOD PRESSURE: 161 MMHG | DIASTOLIC BLOOD PRESSURE: 80 MMHG

## 2018-01-13 VITALS — BODY MASS INDEX: 34.7 KG/M2

## 2018-01-13 DIAGNOSIS — M62.82: ICD-10-CM

## 2018-01-13 DIAGNOSIS — J11.1: Primary | ICD-10-CM

## 2018-01-13 DIAGNOSIS — E87.6: ICD-10-CM

## 2018-01-13 DIAGNOSIS — J02.9: ICD-10-CM

## 2018-01-13 DIAGNOSIS — J40: ICD-10-CM

## 2018-01-13 LAB
ALBUMIN SERPL BCP-MCNC: 3.7 G/DL (ref 3.4–5)
ALP SERPL-CCNC: 105 UNITS/L (ref 46–116)
ALT SERPL W P-5'-P-CCNC: 55 UNITS/L (ref 12–78)
AST SERPL W P-5'-P-CCNC: 49 UNITS/L (ref 15–37)
BASOPHILS # BLD AUTO: 0 X10^3/UL (ref 0–0.1)
BASOPHILS NFR BLD AUTO: 0.4 % (ref 0.2–1)
BUN SERPL-MCNC: 15 MG/DL (ref 7–18)
CALCIUM ALBUM COR SERPL-SCNC: (no result) MG/DL (ref 8.5–10.1)
CALCIUM ALBUM COR SERPL-SCNC: (no result) MMOL/L (ref 136–145)
CALCIUM SERPL-MCNC: 9.3 MG/DL (ref 8.5–10.1)
CHLORIDE SERPL-SCNC: 101 MMOL/L (ref 98–107)
CK MB SERPL-MCNC: 2.2 NG/ML (ref 0–4)
CK SERPL-CCNC: 809 UNITS/L (ref 39–308)
CKMB %: 0.3 % (ref ?–4)
CO2 SERPL-SCNC: 24 MMOL/L (ref 21–32)
CREAT SERPL-MCNC: 1.46 MG/DL (ref 0.7–1.3)
EGFR  BLACK RACES: > 60 (ref 60–?)
EOSINOPHIL # BLD AUTO: 0 X10^3/UL (ref 0–0.2)
EOSINOPHIL NFR BLD AUTO: 0.2 % (ref 0.9–2.9)
ERYTHROCYTE [DISTWIDTH] IN BLOOD BY AUTOMATED COUNT: 13.8 % (ref 11.6–16.5)
HCT VFR BLD AUTO: 42.5 % (ref 42–54)
HGB BLD-MCNC: 15 G/DL (ref 13.5–18)
LYMPHOCYTES # BLD AUTO: 1.1 X10^3/UL (ref 1.3–2.9)
LYMPHOCYTES NFR BLD AUTO: 10.7 % (ref 21–51)
MAGNESIUM SERPL-MCNC: 1.5 MG/DL (ref 1.7–2.9)
MCH RBC QN AUTO: 31 PG (ref 27–34)
MCHC RBC AUTO-ENTMCNC: 35.2 G/DL (ref 33–35)
MCV RBC AUTO: 87.8 FL (ref 80–100)
MONOCYTES # BLD AUTO: 1 X10^3/UL (ref 0.3–0.8)
MONOCYTES NFR BLD AUTO: 9.7 % (ref 0–13)
NEUTROPHILS # BLD AUTO: 8.4 X10^3/UL (ref 2.2–4.8)
NEUTROPHILS NFR BLD AUTO: 79 % (ref 42–75)
PLATELET # BLD: 295 X10^3/UL (ref 150–450)
PMV BLD AUTO: 7.7 FL (ref 7.4–11)
PROT SERPL-MCNC: 7.7 G/DL (ref 6.4–8.2)
RBC # BLD AUTO: 4.84 X10^6/UL (ref 4.7–6)
SODIUM SERPL-SCNC: 133 MMOL/L (ref 136–145)
TROPONIN I SERPL-MCNC: < 0.02 NG/ML (ref 0–1.5)
WBC NRBC COR # BLD AUTO: 10.6 X10^3/UL (ref 3.6–10)

## 2018-01-13 PROCEDURE — 85730 THROMBOPLASTIN TIME PARTIAL: CPT

## 2018-01-13 PROCEDURE — 96375 TX/PRO/DX INJ NEW DRUG ADDON: CPT

## 2018-01-13 PROCEDURE — 93010 ELECTROCARDIOGRAM REPORT: CPT

## 2018-01-13 PROCEDURE — 36415 COLL VENOUS BLD VENIPUNCTURE: CPT

## 2018-01-13 PROCEDURE — 71045 X-RAY EXAM CHEST 1 VIEW: CPT

## 2018-01-13 PROCEDURE — 93005 ELECTROCARDIOGRAM TRACING: CPT

## 2018-01-13 PROCEDURE — 96365 THER/PROPH/DIAG IV INF INIT: CPT

## 2018-01-13 PROCEDURE — 99283 EMERGENCY DEPT VISIT LOW MDM: CPT

## 2018-01-13 PROCEDURE — 87070 CULTURE OTHR SPECIMN AEROBIC: CPT

## 2018-01-13 PROCEDURE — 87880 STREP A ASSAY W/OPTIC: CPT

## 2018-01-13 PROCEDURE — 85025 COMPLETE CBC W/AUTO DIFF WBC: CPT

## 2018-01-13 PROCEDURE — 84484 ASSAY OF TROPONIN QUANT: CPT

## 2018-01-13 PROCEDURE — 96374 THER/PROPH/DIAG INJ IV PUSH: CPT

## 2018-01-13 PROCEDURE — 80053 COMPREHEN METABOLIC PANEL: CPT

## 2018-01-13 PROCEDURE — 94640 AIRWAY INHALATION TREATMENT: CPT

## 2018-01-13 PROCEDURE — 82550 ASSAY OF CK (CPK): CPT

## 2018-01-13 PROCEDURE — 83735 ASSAY OF MAGNESIUM: CPT

## 2018-01-13 PROCEDURE — 82553 CREATINE MB FRACTION: CPT

## 2018-01-13 PROCEDURE — G9035 OSELTAMIVIR PHOSP, BRAND: HCPCS

## 2018-01-13 PROCEDURE — 87502 INFLUENZA DNA AMP PROBE: CPT

## 2018-01-13 PROCEDURE — 85610 PROTHROMBIN TIME: CPT

## 2018-01-13 NOTE — DR.GENAD
HPI





- PCP


Primary Care Physician: TEENA MORTON





- Complaint/Symptoms


Chief Complaint Doctors Comments: Patient is complaining of cold, cough, fever, 

chills, aching all over, sore throat, chest pain when he cough and deep breathe 

for the past three days getting worst today.  States he was recently discharged 

from the hospital with chest pain and hypertension but he is not taking  any 

medicines for hypertension because he has not gone to a doctor since leaving 

the hospital.  states he worked today with cough with greenish sputum 

production.  States he is having sharp xiphoid pain .  States he smokes but 

denies alcohol usage.  He denies any recent trauma.


Chief Complaint:: PT. C/O CHEST PAIN, HEADACHE, SORE THROAT, CHILLS, FEVER, 

COUGH. CHEST PAIN BEGAN YESTERDAY AND IS INTERMITTERNT. DENIES RADIATING PAIN. 

PT. DESCRIBES HIS CHEST PAIN AS "SHARP" IN NATURE. PT. WAS HOSPITALIZED 2 WEEKS 

AGO FOR C/P.





- Nurses notes reviewed


Nurses Notes Review: Yes





- Source


History Provided: Patient





- Mode of Arrival


Mode of Arrival: Ambulatory





- Timing


Onset of Chief Complaint: 01/10/18


Came on: Gradually





- Duration


Duration: Constant


How long: 3


Duration: Days





- Location


Location: anterior chest pain





- Severity


Severity: Moderate





- Modifying Factors


Worsens:: cough and movement


Improves:: nothing





PMH





- PMH


Past Medical History: No


Past Surgical History: No


Surgical History: No History





- Family History


History of Family Medical Conditions: No





- Social History


Does patient currently use any type of tobacco product: Yes


Have you used tobacco products in the last 12 months: Yes


Type of Tobacco Use: Cigarettes


Does any household member use tobacco: No


Alcohol Use: None


Do you use any recreational Drugs:: No


Lives With: Spouse


Lives Where: Home





- infectious screening


In the last 2 months have you had wt loss of >10#?: NO


Have you had fever, night sweats or hemotysis?: No


Have you traveled outside the country in the last 6 months?: No


Isolation: Standard





ROS





- Review of Systems


Constitutional: No Symptoms Reported, Fever, Weakness, Fatigue.  negative: See 

HPI, Chills, Diaphoresis, Malaise, Irritable, Loss of Appetite, Other


Eyes: No Symptoms Reported


ENTM: No Symptoms Reported, Nose Discharge, Throat Pain


Respiratoy: No Symptoms Reported, Productive Cough, Short of Breath, Wheezing


Cardiovascular: No Symptoms Reported, Chest Pain.  negative: See HPI, Edema, 

Palpitations, Syncope, Cyanosis, Skin Mottling, Other


Gastrointestinal/Abdominal: No Symptoms Reported.  negative: See HPI, Abdominal 

Pain, Constipation, Diarrhea, Nausea, Vomiting, Food Intolerance, Other


Genitourinary: No Symptoms Reported


Neurological: No Symptoms Reported


Musculoskeletal: No Symptoms Reported


Integumentary: No Symptoms Reported


Hematologic/Lymphatic: No Symptoms Reported.  negative: See HPI, Anemia, Blood 

Clots, Easy Bleeding, Easy Bruising, Swollen Glands, Lymphadenopathy, Other


Endocrine: No Symptoms Reported


Psychiatric: No Symptoms Reported





PE





- Vital Signs


Vitals: 


 





Temperature                      100.8 F


Pulse Rate [Apical]              101


Pulse Rate                       105


Respiratory Rate                 18


Blood Pressure [Left Arm]        161/80


Blood Pressure [Right Arm]       136/75


Blood Pressure                   127/74


O2 Sat by Pulse Oximetry         95











- General


Limitations: No Limitations


General Appearance: Alert, In Distress (moderate)





- Head


Head Exam: Normal Inspection, Atraumatic, Normocephalic





- Eyes


Eye exam: Normal Appearance, PERRL, EOMI.  negative: Scleral Icterus, 

Conjunctival Injection, Nystagmus, Miosis, Mydrasis, Periorbital Swelling, 

Periorbital Tenderness, Other





- ENT


ENT Exam: Normal Exam, Normal Oropharynx, Normal  External Ear Exam, Mucous 

Membranes Moist, TM's Normal Bilaterally


External Ear Exam: Normal External Inspection


TM/Canal Exam: Bilateral Normal


Nose Exam: Normal Nose Exam


Mouth Exam: Normal Inspection


Throat Exam: Normal Inspection





- Neck


Neck Exam: Normal Inspection, Full ROM, Trachea Midline





- Chest


Chest Inspection: Normal Inspection, Symmetric Chest Wall Rise.  negative: 

Tenderness, Rash, Abscess, Other





- Respiratory


Respiratory Exam: Normal Lung Sounds Bilat


Respiratory Exam: Bilateral Clear to Auscultation, Bilateral Wheezing





- Cardiovascular


Cardiovascular Exam: Regular Rate, Normal Rhythm, Normal Heart Sounds





- Abdominal Exam


Abdominal Exam: Normal Inspection, Normal Bowel Sounds, Soft.  negative: 

Distention, Tenderness, Guarding, Rebound, Rigidity, Dimnished Bowel Sounds, 

Hyperactive Bowel Sounds, Hypoactive Bowel Sounds, Organomegaly, Trauma, 

Incision, Ascites, Mass, Bruit, Pulsatile Mass, Hernia, Other


Abdominal Tenderness: negative: RUQ, RLQ, LUQ, LLQ, Epigastrium, Suprapubic, 

Diffuse, Mild, Moderate, Severe, Other





- Extremities


Extremities Exam: Normal Inspection, Full ROM, Normal Capillary Refill.  

negative: Tenderness, Edema, Joint Swelling, Calf Tenderness, Other





- Back


Back Exam: Normal Inspection, Full ROM





- Neurologic


Neurological Exam: Alert, Oriented X3, CN II-XII Intact, Normal Gait, Reflexes 

Normal





- Psychiatric


Psychiatric Exam: Normal Affect, Normal Mood.  negative: Depressed, Agitated, 

Anxious, Flat Affect, Manic, Homicidal Ideation, Suicidal Ideation, Other





- Skin


Skin Exam: Warm, Dry, Intact, Normal Color





ROR





- Labs Reviewed


Laboratory Results Reviewed?: Yes (all labs and x-ray results reviewed and 

discussed with patient)


Result Diagrams: 


 01/13/18 15:15





 01/13/18 15:15


Laboratory: 


 











WBC  10.6 X10^3/uL (3.6-10.0)  H  01/13/18  15:15    


 


RBC  4.84 X10^6/uL (4.7-6.0)   01/13/18  15:15    


 


Hgb  15.0 g/dL (13.5-18.0)   01/13/18  15:15    


 


Hct  42.5 % (42.0-54.0)   01/13/18  15:15    


 


MCV  87.8 fL (80.0-100.0)   01/13/18  15:15    


 


MCH  31.0 pg (27.0-34.0)   01/13/18  15:15    


 


MCHC  35.2 g/dL (33.0-35.0)  H  01/13/18  15:15    


 


RDW  13.8 % (11.6-16.5)   01/13/18  15:15    


 


Plt Count  295 X10^3/uL (150.0-450.0)   01/13/18  15:15    


 


MPV  7.7 fL (7.4-11.0)   01/13/18  15:15    


 


Neut %  79.0 % (42.0-75.0)  H  01/13/18  15:15    


 


Lymph %  10.7 % (21.0-51.0)  L  01/13/18  15:15    


 


Mono %  9.7 % (0.0-13.0)   01/13/18  15:15    


 


Eos %  0.2 % (0.9-2.9)  L  01/13/18  15:15    


 


Baso %  0.4 % (0.2-1.0)   01/13/18  15:15    


 


Neut #  8.4 x10^3/uL (2.2-4.8)  H  01/13/18  15:15    


 


Lymph #  1.1 X10^3/uL (1.3-2.9)  L  01/13/18  15:15    


 


Mono #  1.0 x10^3/uL (0.3-0.8)  H  01/13/18  15:15    


 


Eos #  0.0 x10^3/uL (0.0-0.2)   01/13/18  15:15    


 


Baso #  0.0 X10^3/uL (0.0-0.1)   01/13/18  15:15    


 


Absolute Nucleated RBC  0.1 /100WBC  01/13/18  15:15    


 


INR Target Range  -   01/13/18  15:15    


 


INR  1.07  (0.8-1.3)   01/13/18  15:15    


 


PTT  35.9 SECONDS (22.9-36.5)   01/13/18  15:15    


 


PTT Comment  -   01/13/18  15:15    


 


Sodium  133 mmol/L (136-145)  L  01/13/18  15:15    


 


Corrected Sodium  TNP   01/13/18  15:15    


 


Potassium  3.8 mmol/L (3.5-5.1)   01/13/18  15:15    


 


Chloride  101 mmol/L ()   01/13/18  15:15    


 


Carbon Dioxide  24.0 mmol/L (21-32)   01/13/18  15:15    


 


BUN  15 mg/dL (7-18)   01/13/18  15:15    


 


Creatinine  1.46 mg/dL (0.70-1.30)  H  01/13/18  15:15    


 


Est GFR (MDRD) Af Amer  > 60  (>60)   01/13/18  15:15    


 


Est GFR (MDRD) Non-Af  57  (>60)  L  01/13/18  15:15    


 


Glucose  102 mg/dL (65-99)  H  01/13/18  15:15    


 


Calcium  9.3 mg/dL (8.5-10.1)   01/13/18  15:15    


 


Corrected Calcium  TNP   01/13/18  15:15    


 


Magnesium  1.5 mg/dL (1.7-2.9)  L  01/13/18  15:15    


 


Total Bilirubin  0.90 mg/dL (0.2-1.0)   01/13/18  15:15    


 


AST  49 Units/L (15-37)  H  01/13/18  15:15    


 


ALT  55 Units/L (12-78)   01/13/18  15:15    


 


Alkaline Phosphatase  105 Units/L ()   01/13/18  15:15    


 


Creatine Kinase  809 Units/L ()  H  01/13/18  15:15    


 


CK-MB (CK-2)  2.2 ng/mL (0-4.0)   01/13/18  15:15    


 


CK/CKMB % Calc  0.3 % (<4)   01/13/18  15:15    


 


Troponin I  < 0.02 ng/mL (0-1.5)   01/13/18  15:15    


 


Total Protein  7.7 g/dL (6.4-8.2)   01/13/18  15:15    


 


Albumin  3.7 g/dL (3.4-5.0)   01/13/18  15:15    


 


Globulin  4.0 g/dL (2.5-4.5)   01/13/18  15:15    


 


Albumin/Globulin Ratio  0.9 Ratio (1.1-2.1)  L  01/13/18  15:15    


 


Influenza Type A (PCR)  Positive  (NEGATIVE)  A  01/13/18  14:55    


 


Influenza Type B (PCR)  Negative  (NEGATIVE)   01/13/18  14:55    


 


Streptococcus Screen  Negative  (NEGATIVE)   01/13/18  15:26    














- XRAY


XRAY Interpreted by: Radiologist (CXR:  diffuse interstitial densities suggest 

infectious etiology; no acute cardiac changes)





- EKG


Rate: 106


Rhythm: ST


Hypertrophy: LVH


ST: Nonsp





- Diagnosis


Discharge Problem: 


 Influenza A (H1N1), Bronchitis, Pharyngitis, Hypokalemia, Rhabdomyolysis








- Discharge Plan


Disposition: 01 HOME, SELF-CARE


Condition: Stable


Prescriptions: 


Cephalexin [KEFLEX  MG *] 500 mg PO TID #30 cap


Ibuprofen [MOTRIN  MG *] 800 mg PO Q8H PRN #40 tab


 PRN Reason: Pain/Inflammation


Oseltamivir Phosphate [Tamiflu] 75 mg PO BID #10 cap





- Follow ups/Referrals


Follow ups/Referrals: 


AMARIS MORTON [Primary Care Provider] - 3 days





- Instructions


Instructions:  Influenza, Adult, Easy-to-Read, Sinusitis, Adult, Easy-to-Read, 

Strep Throat, Easy-to-Read, Acute Bronchitis

## 2018-01-13 NOTE — RAD
HISTORY:  Dyspnea and chest pain.



Study: Two views chest. 



Comparison: 12/30/2017. 



Findings:



The trachea is midline.  The cardiac silhouette is stable.  Enlarged pulmonary arteries and diffusely
 increased interstitial densities throughout the lung fields remain which can be seen with infectious
 etiologies, mild edema, or chronic interstitial lung disease. This can be followed up with noncontra
st nature CT imaging of the chest is an outpatient for further inspection. No new infiltrates or effu
sions are appreciated on this exam.  The bony thorax is unremarkable.  



IMPRESSION: 

 

Stable chest with widespread interstitial densities/disease, as above.







Reported By:Electronically Signed by MARY ANN LÓPEZ III, MD at 1/13/2018 3:23:15 PM